# Patient Record
Sex: FEMALE | Employment: FULL TIME | URBAN - NONMETROPOLITAN AREA
[De-identification: names, ages, dates, MRNs, and addresses within clinical notes are randomized per-mention and may not be internally consistent; named-entity substitution may affect disease eponyms.]

---

## 2024-09-30 ENCOUNTER — APPOINTMENT (OUTPATIENT)
Dept: RADIOLOGY | Facility: MEDICAL CENTER | Age: 59
End: 2024-09-30
Payer: COMMERCIAL

## 2024-09-30 ENCOUNTER — CONSULT (OUTPATIENT)
Dept: PAIN MEDICINE | Facility: CLINIC | Age: 59
End: 2024-09-30
Payer: COMMERCIAL

## 2024-09-30 VITALS
HEIGHT: 65 IN | OXYGEN SATURATION: 99 % | TEMPERATURE: 98.2 F | WEIGHT: 201.4 LBS | RESPIRATION RATE: 16 BRPM | HEART RATE: 59 BPM | DIASTOLIC BLOOD PRESSURE: 90 MMHG | BODY MASS INDEX: 33.55 KG/M2 | SYSTOLIC BLOOD PRESSURE: 149 MMHG

## 2024-09-30 DIAGNOSIS — M51.16 LUMBAR DISC DISEASE WITH RADICULOPATHY: ICD-10-CM

## 2024-09-30 DIAGNOSIS — M54.16 LUMBAR RADICULOPATHY: ICD-10-CM

## 2024-09-30 DIAGNOSIS — G89.4 CHRONIC PAIN SYNDROME: ICD-10-CM

## 2024-09-30 DIAGNOSIS — M47.816 LUMBAR SPONDYLOSIS: ICD-10-CM

## 2024-09-30 DIAGNOSIS — M54.16 LUMBAR RADICULOPATHY: Primary | ICD-10-CM

## 2024-09-30 PROCEDURE — 72114 X-RAY EXAM L-S SPINE BENDING: CPT

## 2024-09-30 PROCEDURE — 99204 OFFICE O/P NEW MOD 45 MIN: CPT | Performed by: ANESTHESIOLOGY

## 2024-09-30 RX ORDER — METHYLPREDNISOLONE 4 MG
TABLET, DOSE PACK ORAL
COMMUNITY
Start: 2024-09-16

## 2024-09-30 RX ORDER — DULOXETIN HYDROCHLORIDE 30 MG/1
30 CAPSULE, DELAYED RELEASE ORAL DAILY
Qty: 30 CAPSULE | Refills: 1 | Status: SHIPPED | OUTPATIENT
Start: 2024-09-30 | End: 2024-10-30

## 2024-09-30 RX ORDER — MELOXICAM 15 MG/1
15 TABLET ORAL DAILY
COMMUNITY
Start: 2024-08-16

## 2024-09-30 RX ORDER — SEMAGLUTIDE 0.25 MG/.5ML
INJECTION, SOLUTION SUBCUTANEOUS
COMMUNITY
Start: 2024-06-23

## 2024-09-30 RX ORDER — LOSARTAN POTASSIUM 100 MG/1
100 TABLET ORAL DAILY
COMMUNITY

## 2024-09-30 NOTE — PATIENT INSTRUCTIONS
Patient Education     Core Strengthening Exercises on Back or on Hands and Knees   About this topic   Your core muscles are in your chest, back, buttock, and stomach area. They are your abdominal, back, and pelvis muscles. These muscles help keep your body stable when using your arms or legs. They also help with balance and posture. There are many exercises you can do to keep these muscles strong.  If you have back problems like a compression fracture or a ruptured disc, doing some of these exercises could make your problem worse. Some of these exercises may cause lower back pain.  General   Before starting with a program, ask your doctor if you are healthy enough to do these exercises. Your doctor may have you work with a , chiropractor, or physical therapist to make a safe exercise program to meet your needs.  Strengthening Exercises   Strengthening exercises keep your muscles firm and strong. Start by repeating each exercise 2 to 3 times. Work up to doing each exercise 10 times. Try to do the exercises 2 to 3 times each day. Hold each exercise for 3 to 5 seconds. Do all exercises slowly.  Hip lifts ? Lie on your back with your knees bent and feet flat on the floor. Tighten your stomach muscles and push your heels into the floor to lift your buttocks off the floor. Relax.  Pelvic tilts ? Lie on your back with your knees bent and feet flat on the floor. Tighten your stomach muscles and press your lower back down to the floor. Relax.  Straight leg raises lying down ? Lie on your back with one leg straight. Bend your other knee so the foot is flat on the bed. Keeping your leg straight, lift the leg up to the level of your other knee. Lower it back down. Repeat with the other leg.  Knee flex lying down ? Lie on your back with both knees bent and your feet flat on the floor. Tighten your belly muscles. Raise one leg up and back down as if you are marching in slow motion. Keep belly muscles tight while you move  your leg. Switch legs. To make this exercise harder, raise both arms straight up in the air. Tighten your belly muscles. When you raise one leg up, reach the opposite arm over your head. Switch, moving the opposite arm and leg until you have done 10 repetitions on each side.  Abdominal crunches ? Lie on your back with both knees bent. Keep your feet flat on the floor. Place your hands in one of these positions. Try starting with the first position since it is the easiest. As you get better, use the other positions to make it harder.  Crunches with arms at sides.  Crunches with arms across chest.  Crunches with arms behind head. Be careful not to interlock your fingers behind your neck or head while doing crunches. This may add tension to your neck and cause strain.  Look at the ceiling. Tighten your belly muscles and lift your shoulders and upper back off the floor. Breathe out while you are doing this. Lower your shoulders to the floor. Breathe in while you are doing this. Relax your belly muscles all the way before starting another crunch.  Arm and leg lifts on hands and knees ? Start on your hands and knees. With all of these exercises, keep your back as level as possible. If you are having trouble with this, you may want to put a small object on your back such as a book. If it falls off, you are not keeping your back level enough during the exercise.  Lift one arm up to shoulder level and hold. Lower it back down. Now, lift up the other arm and hold.  Lift one leg up and kick it straight out until it is in line with your back and hold. Lower it back down. Now, lift up the other leg and hold.  Lift one arm and the OPPOSITE leg up at the same time and hold. Lower them down. Now, repeat using the other arm and leg. This is a very hard exercise. It may take time to be able to do this.               What will the results be?   Stronger core  Better balance  More toned belly and back muscles  Easier to do daily  activities  Better sports performance  Less low back pain  Helpful tips   Stay active and work out to keep your muscles strong and flexible.  Keep a healthy weight to avoid putting too much stress on your spine. Eat a healthy diet to keep your muscles healthy.  Be sure you do not hold your breath when exercising. This can raise your blood pressure. If you tend to hold your breath, try counting out loud when exercising. If any exercise bothers you, stop right away.  Try walking or cycling at an easy pace for a few minutes to warm up your muscles. Do this again after exercising.  Exercise may be slightly uncomfortable, but you should not have sharp pains. If you do get sharp pains, stop what you are doing. If the sharp pains continue, call your doctor.  Last Reviewed Date   2021-03-18  Consumer Information Use and Disclaimer   This generalized information is a limited summary of diagnosis, treatment, and/or medication information. It is not meant to be comprehensive and should be used as a tool to help the user understand and/or assess potential diagnostic and treatment options. It does NOT include all information about conditions, treatments, medications, side effects, or risks that may apply to a specific patient. It is not intended to be medical advice or a substitute for the medical advice, diagnosis, or treatment of a health care provider based on the health care provider's examination and assessment of a patient’s specific and unique circumstances. Patients must speak with a health care provider for complete information about their health, medical questions, and treatment options, including any risks or benefits regarding use of medications. This information does not endorse any treatments or medications as safe, effective, or approved for treating a specific patient. [employer] and its affiliates disclaim any warranty or liability relating to this information or the use thereof. The use of this information  is governed by the Terms of Use, available at https://www.woltersRPOuwer.com/en/know/clinical-effectiveness-terms   Copyright   Copyright © 2024 [employer] and its affiliates and/or licensors. All rights reserved.

## 2024-09-30 NOTE — PROGRESS NOTES
Assessment:  1. Lumbar radiculopathy    2. Lumbar spondylosis    3. Lumbar disc disease with radiculopathy    4. Chronic pain syndrome        Plan:  Patient is a 59-year-old female complains of low back pain and bilateral leg pain with chronic patient secondary lumbar spondylosis, lumbar degenerative disc disease, lumbar radiculopathy presents to office for initial consultation.  Patient had seen a chiropractor reported after a couple adjustments severe shooting pain into both legs right worse than left.  Patient had difficulty sleeping or standing for long peers of time secondary radicular symptoms.  1.  We will provide patient with a home exercise regimen for her to perform lumbar core strengthening exercises 5 days, 3 sets, 12 repetitions of each exercise  2.  We will trial Cymbalta 30 mg p.o. nightly for the radicular symptoms  3.  We will order an x-ray of the lumbar spine flexion-extension to rule out any instability  4.  Follow-up in 6 weeks      History of Present Illness:    The patient is a 59 y.o. female who presents for consultation in regards to Back Pain.  Symptoms have been present for 4 months. Symptoms began without any precipitating injury or trauma. Pain is reported to be 10 on the numeric rating scale.  Symptoms are felt nearly constantly and worst in the evening, nighttime.  Symptoms are characterized as cramping.  Symptoms are associated with bilateral leg weakness.  Aggravating factors include kneeling, standing, bending, leaning forward, leaning bckward, walking, and exercise.  Relieving factors include sitting and relaxation.  No change in symptoms with turning the head, coughing/sneezing, and bowel movements.  Patient tried any pain relieving modalities at this time.  Medications to relieve symptoms include none.    Review of Systems:    Review of Systems   Constitutional:  Positive for unexpected weight change.   Cardiovascular:  Positive for leg swelling.   Musculoskeletal:  Positive for  "back pain and myalgias.   All other systems reviewed and are negative.          History reviewed. No pertinent past medical history.    History reviewed. No pertinent surgical history.    History reviewed. No pertinent family history.    Social History     Occupational History    Not on file   Tobacco Use    Smoking status: Not on file    Smokeless tobacco: Not on file   Substance and Sexual Activity    Alcohol use: Not on file    Drug use: Not on file    Sexual activity: Not on file         Current Outpatient Medications:     meloxicam (MOBIC) 15 mg tablet, Take 15 mg by mouth daily, Disp: , Rfl:     methylPREDNISolone 4 MG tablet therapy pack, 1 TABLET BY MOUTH AS DIRECTED TAKE AS INSTRUCTED ON BOX, Disp: , Rfl:     Wegovy 0.25 MG/0.5ML, 0.5 ML SUBCUTANEOUS WEEKLY 30 DAYS, Disp: , Rfl:     No Known Allergies    Physical Exam:    /90   Pulse 59   Temp 98.2 °F (36.8 °C)   Resp 16   Ht 5' 5\" (1.651 m)   Wt 91.4 kg (201 lb 6.4 oz)   SpO2 99%   BMI 33.51 kg/m²     Constitutional: normal, well developed, well nourished, alert, in no distress and non-toxic and no overt pain behavior. and obese  Eyes: anicteric  HEENT: grossly intact  Neck: supple, symmetric, trachea midline and no masses   Pulmonary:even and unlabored  Cardiovascular:No edema or pitting edema present  Skin:Normal without rashes or lesions and well hydrated  Psychiatric:Mood and affect appropriate  Neurologic:Cranial Nerves II-XII grossly intact  Musculoskeletal:antalgic    Lumbar/Sacral Spine examination demonstrates.  Full range of motion lumbar spine with pain upon: flexion, lateral rotation to the left/right, and bending to the left/right.  Bilateral lumbar paraspinals tender to palpation. Muscle spasms noted in the lumbar area bilaterally. 4/5 lower extremity strength in all muscle groups bilaterally. Positive seated straight leg raise for bilateral lower extremities.  Sensitivity to light touch intact bilateral lower extremities. 2+ " reflexes in the patella and Achilles.  No ankle clonus    Imaging    XR lumbar spine dated: 8/19/20243  Impression: degenerative disc and facet disease as described. Mild atherosclerotic vascular disease        No orders to display       No orders of the defined types were placed in this encounter.

## 2024-10-18 ENCOUNTER — TELEPHONE (OUTPATIENT)
Dept: PAIN MEDICINE | Facility: CLINIC | Age: 59
End: 2024-10-18

## 2024-10-18 DIAGNOSIS — M47.816 LUMBAR SPONDYLOSIS: ICD-10-CM

## 2024-10-18 DIAGNOSIS — M54.16 LUMBAR RADICULOPATHY: ICD-10-CM

## 2024-10-18 DIAGNOSIS — M51.16 LUMBAR DISC DISEASE WITH RADICULOPATHY: ICD-10-CM

## 2024-10-18 DIAGNOSIS — G89.4 CHRONIC PAIN SYNDROME: ICD-10-CM

## 2024-10-18 RX ORDER — DULOXETIN HYDROCHLORIDE 30 MG/1
30 CAPSULE, DELAYED RELEASE ORAL DAILY
Qty: 30 CAPSULE | Refills: 1 | Status: SHIPPED | OUTPATIENT
Start: 2024-10-18 | End: 2024-11-17

## 2024-10-18 NOTE — TELEPHONE ENCOUNTER
William Guerra MD   to [facility]       10/18/24  1:52 PM  Note      X-ray lumbar spine showed no instability with flexion-extension however she has multiple levels of arthritic changes in addition to decreased space of the disc at the L4-L5 and L5-S1 spaces            RN s/w pt's daughter as per ANNIE on chart and she requested report and pictures of same RN to send to Med records.

## 2024-10-18 NOTE — TELEPHONE ENCOUNTER
X-ray lumbar spine showed no instability with flexion-extension however she has multiple levels of arthritic changes in addition to decreased space of the disc at the L4-L5 and L5-S1 spaces

## 2024-11-11 DIAGNOSIS — M47.816 LUMBAR SPONDYLOSIS: ICD-10-CM

## 2024-11-11 DIAGNOSIS — M54.16 LUMBAR RADICULOPATHY: ICD-10-CM

## 2024-11-11 DIAGNOSIS — M51.16 LUMBAR DISC DISEASE WITH RADICULOPATHY: ICD-10-CM

## 2024-11-11 DIAGNOSIS — G89.4 CHRONIC PAIN SYNDROME: ICD-10-CM

## 2024-11-11 RX ORDER — DULOXETIN HYDROCHLORIDE 30 MG/1
30 CAPSULE, DELAYED RELEASE ORAL DAILY
Qty: 30 CAPSULE | Refills: 0 | Status: SHIPPED | OUTPATIENT
Start: 2024-11-11 | End: 2024-12-11